# Patient Record
Sex: MALE | Race: WHITE
[De-identification: names, ages, dates, MRNs, and addresses within clinical notes are randomized per-mention and may not be internally consistent; named-entity substitution may affect disease eponyms.]

---

## 2020-08-10 ENCOUNTER — HOSPITAL ENCOUNTER (OUTPATIENT)
Dept: HOSPITAL 84 - D.CATH | Age: 65
Discharge: HOME | End: 2020-08-10
Attending: INTERNAL MEDICINE
Payer: MEDICARE

## 2020-08-10 VITALS — WEIGHT: 224.87 LBS | BODY MASS INDEX: 30.46 KG/M2 | HEIGHT: 72 IN | BODY MASS INDEX: 30.46 KG/M2

## 2020-08-10 VITALS — SYSTOLIC BLOOD PRESSURE: 140 MMHG | DIASTOLIC BLOOD PRESSURE: 59 MMHG

## 2020-08-10 DIAGNOSIS — I20.0: Primary | ICD-10-CM

## 2020-08-10 DIAGNOSIS — R07.89: ICD-10-CM

## 2020-08-10 LAB
ALT SERPL-CCNC: 49 U/L (ref 10–68)
ANION GAP SERPL CALC-SCNC: 12.1 MMOL/L (ref 8–16)
BASOPHILS NFR BLD AUTO: 0.2 % (ref 0–2)
BUN SERPL-MCNC: 11 MG/DL (ref 7–18)
CALCIUM SERPL-MCNC: 9.3 MG/DL (ref 8.5–10.1)
CHLORIDE SERPL-SCNC: 109 MMOL/L (ref 98–107)
CHOLEST/HDLC SERPL: 1.7 RATIO (ref 2.3–4.9)
CO2 SERPL-SCNC: 26.9 MMOL/L (ref 21–32)
CREAT SERPL-MCNC: 0.9 MG/DL (ref 0.6–1.3)
EOSINOPHIL NFR BLD: 1.4 % (ref 0–7)
ERYTHROCYTE [DISTWIDTH] IN BLOOD BY AUTOMATED COUNT: 13.6 % (ref 11.5–14.5)
GLUCOSE SERPL-MCNC: 96 MG/DL (ref 74–106)
HCT VFR BLD CALC: 40.8 % (ref 42–54)
HDLC SERPL-MCNC: 59 MG/DL (ref 32–96)
HGB BLD-MCNC: 13.1 G/DL (ref 13.5–17.5)
IMM GRANULOCYTES NFR BLD: 0.2 % (ref 0–5)
LDL-HDL RATIO: 0.5 RATIO (ref 1.5–3.5)
LDLC SERPL-MCNC: 29 MG/DL (ref 0–100)
LYMPHOCYTES NFR BLD AUTO: 17.8 % (ref 15–50)
MCH RBC QN AUTO: 30.4 PG (ref 26–34)
MCHC RBC AUTO-ENTMCNC: 32.1 G/DL (ref 31–37)
MCV RBC: 94.7 FL (ref 80–100)
MONOCYTES NFR BLD: 7.5 % (ref 2–11)
NEUTROPHILS NFR BLD AUTO: 72.9 % (ref 40–80)
OSMOLALITY SERPL CALC.SUM OF ELEC: 285 MOSM/KG (ref 275–300)
PLATELET # BLD: 186 10X3/UL (ref 130–400)
PMV BLD AUTO: 11.8 FL (ref 7.4–10.4)
POTASSIUM SERPL-SCNC: 4 MMOL/L (ref 3.5–5.1)
RBC # BLD AUTO: 4.31 10X6/UL (ref 4.2–6.1)
SODIUM SERPL-SCNC: 144 MMOL/L (ref 136–145)
TRIGL SERPL-MCNC: 74 MG/DL (ref 30–200)
WBC # BLD AUTO: 6.6 10X3/UL (ref 4.8–10.8)

## 2020-08-10 NOTE — HEMODYNAMI
PATIENT:SU HAM                                    MEDICAL RECORD: [id number]
: 51                                            LOCATION:[facility]          
ACCT# [id number]                                       ADMISSION DATE: 08/10/20
 
 
 Generatedon:8/10/017187:18
Patient name: SU HAM Patient #: [id number] Visit #: [id number] SSN: 
: 1955 Date
of study: 8/10/2020
Page: Of
Hemodynamic Procedure Report
****************************
Patient Data
Patient Demographics
Procedure consent was obtained
First Name: SU            Gender: Male
Last Name: FATOUMATA         : 1955
Patient #: [id number]       Age: 65 year(s)
Visit #: [id number]       Race: 
Accession #:
[id number]
Additional ID: [id number]
Contact details
Address: [address] [place] [address]      Phone: [phone]
DRIVE
State: [place]
City: [place]
Zip code: [address]
Past Medical History
Performed procedures and imaging results
Date     Procedure        Procedure Results      Comments
Stress testing   Positive->Intermediate
with SPECT MPI   risk
Allergies: No known allergies
Admission
Admission Data
Admission Date: 8/10/2020   Admission Time: 9:04
Procedure
Procedure Types
Cath Procedure
Diagnostic Procedure
LHC
LHC w/Coronaries
Sedation Charges
Moderate Sedation up to 15 minutes
Procedure Description
Procedure Date
Procedure Date: 8/10/2020
Procedure Start Time: 10:57
Procedure End Time: 11:12
Procedure Staff
Name                            Function
Juan Carlos Augustine RT                  Scrub
Elvira Juarez RT                    Monitor
Mayur Gould RN                  Nurse
Denzel Alfonso MD                   Performing Physician
Indication
Abnormal cardiolyte
Procedure Data
 
Cath Procedure
Fluoroscopy
Diagnostic fluoroscopy      Total fluoroscopy Time: 4.5
time: 4.5 min               min
Diagnostic fluoroscopy      Total fluoroscopy dose: 715
dose: 715 mGy               mGy
Contrast Material
Contrast Material Type                       Amount (ml)
Isovue 300                                   76
Entry Location
Entry     Primary  Successful  Side  Size  Upsize Upsize Entry    Closure     Peterson
ccessful  Closure
Location                             (Fr)  1 (Fr) 2 (Fr) Remarks  Device        
          Remarks
Radial                         Right 6 Fr                         Mechanical
artery                               Short                        Compression
Estimated blood loss: 5 ml
Diagnostic catheters
Device Type               Used For           End Catheter
Placement
DIAGNOSTIC [place] 110cm 5  Multi-vessel
Fr catheter (516809)      Angiography
DIAGNOSTIC [place] 110cm    Left Coronary
5Fr catheter (223940)     Angiography
Procedure Complications
No complications
Procedure Medications
Medication           Administration Route Dosage
Oxygen               etCO2 Nasal cannula  2 l/min
Lidocaine 2%         added to field       20
Heparin Flush Bag    added to field       2 bags
(1000units/500ml NS)
0.9% NaCl            I.V.                 100 ml/hr
Versed               I.V.                 2 mg
Fentanyl             I.V.                 100 mcg
Versed               I.V.                 2 mg
Fentanyl             I.V.                 50 mcg
Versed               I.V.                 2 mg
Fentanyl             I.V.                 50 mcg
Versed               I.V.                 2 mg
Radial Cocktail      I.A.                 1 syringe
(Verapamil 2mg/Nitro
400mcg/Heparin
1500units)
Hemodynamics
Rest
Heart Rate: 68 (bpm)
Pressure Samples
Time     Site     Value (mmHg) Purpose      Heart      Use
Rate(bpm)
10:59    LV       118/-3,12    Snapshot     84
11:00    LV       139/-6,12    Snapshot     63
Gradients
Valve  Time  Site Site Mean    SEP/DFP    Peak To Heart  Use
1    2    (mmHg)  (sec/min)  Peak    Rate
(mmHg)  (bpm)
Aortic 11:00 LV   AO                              93
Snapshots
Pre Cath      Intra         NCS           Post Cath
Vital Signs
 
Time     Heart  Resp   SPO2 etCO2   NIBP (mmHg) Rhythm  Pain    Sedation
Rate   (ipm)  (%)  (mmHg)                      Status  Level
(bpm)
10:38:47 60     16     97   0       153/67(102) NSR     0 (11)  10(A)
, No
pain
10:44:09 47     15     91   18.8    138/72(119) NSR     0 (11)  10(A)
, No
pain
10:48:29 53     13     91   17.2    138/74(116) NSR     0 (11)  10(A)
, No
pain
10:53:44 46     11     94   12.7    130/66(101) NSR     0 (11)  10(A)
, No
pain
10:58:25 62     19     96   35.3    144/84(121) NSR     0 (11)  10(A)
, No
pain
11:02:43 88     13     92   33.8    121/63(94)  NSR     0 (11)  9(A)
, No
pain
11:07:46 59     12     94   13.5    130/66(106) NSR     0 (11)  9(A)
, No
pain
11:12:06 55     11     94   33.8    130/71(105) NSR     0 (11)  10(A)
, No
pain
Medications
Time     Medication       Route   Dose    Verified Delivered Reason       Notes 
 Effectiveness
by       by
10:41:03 Oxygen           etCO2   2 l/min Denzel     Buffie    used for
Nasal           Kaden Gould RN   procedure
cannula
10:41:10 Lidocaine 2%     added   20ml    Denzel     Denzel      for local
to      vial    Kaden Alfonso MD  anesthetic
field
10:41:16 Heparin Flush    added   2 bags  Denzel     Denzel      used for
Bag              to              Kaden Alfonso MD  procedure
(1000units/500ml field
NS)
10:41:25 0.9% NaCl        I.V.    100     Denzel     Buffie    Per
ml/hr   Kaden Gould RN   physician
10:47:54 Versed           I.V.    2 mg    Denzel     Buffie    for sedation
Kaden Gould RN
10:48:00 Fentanyl         I.V.    100 mcg Denzel     Buffie    for sedation
Kaden Gould RN
10:53:00 Fentanyl         I.V.    50 mcg  Denzel     Buffie    for sedation
Kaden Gould RN
10:53:55 Versed           I.V.    2 mg    Denzel     Buffie    for sedation
Kaden Gould RN
10:59:05 Radial Cocktail  I.A.    1       Denzel     Denzel      for
(Verapamil               syringe Kaden Alfonso MD  vasodilation
2mg/Nitro
400mcg/Hepari
10:59:49 Versed           I.V.    2 mg    Denzel     Buffie    for sedation
Kaden Gould RN
10:59:52 Fentanyl         I.V.    50 mcg  Denzel     Buffie    for sedation
Kaden Gould RN
11:03:39 Versed           I.V.    2 mg    Denzel     Buffie    for sedation
 
Kaden Gould RN
Procedure Log
Time     Note
10:20:30 Mayur Gould RN sent for patient. Start room use.
10:24:33 Informed consent obtained and on chart
10:24:39 Diagnostic Cath Status : Elective
10:27:31 Indication : Abnormal cardiolyte
10:33:53 Time tracking: Regular hours (M-F 7:00 - 5:00)
10:33:59 Plan of Care:Hemodynamics will remain stable., Cardiac
rhythm will remain stable., Comfort level will be
maintained., Respiratory function will remain
adequate., Patient/ family verbilizes understanding of
procedure., Procedure tolerated without complication.,
Recovers from procedure without complications..
10:34:10 Patient received from Pre/Post Procedure Room to [facility] 1
Alert and oriented. Tansferred to table in Supine
position.
10:34:12 Warm blankets applied, and bita hugger turned on for
patient comfort.
10:34:13 Correct patient and procedure confirmed by team.
10:34:14 ECG and BP/O2 sat monitors applied to patient.
10:37:01 Use device set Radial Dx or PCI
10:37:03 ACIST Syringe (04329) opened to sterile field.
10:37:04 Medline Cath Pack ([id number]) opened to sterile field.
10:37:05 ACIST Hand Control (52673) opened to sterile field.
10:37:05 Bag Decanter (2002S) opened to sterile field.
10:37:06 ACIST Manifold (11147) opened to sterile field.
10:37:07 Tegaderm 4 x 4 (1626W) opened to sterile field.
10:37:08 MBrace Wrist Support ([id number]) opened to sterile
field.
10:37:11 NEEDLE Cook 21G 4cm Radial (N32659) opened to sterile
field.
10:37:20 EMERALD Guide Wire (507-811) opened to sterile field.
10:37:21 SHEATH 6FR RAIN (0164372) opened to sterile field.
10:37:31 Vital chart was started
10:37:31 Baseline sample Acquired.
10:38:23 Baseline sample Acquired.
10:38:32 Rhythm: sinus bradycardia
10:38:39 Full Disclosure recording started
10:38:57 H&P Date Dictated: 8/10/2020 Within 30 days and on
chart..
10:38:59 Pre-procedure instructions explained to patient.
10:39:01 Pre-op teaching completed and patient verbalized
understanding.
10:39:07 Family in waiting room.
10:39:11 Patient NPO since Midnight.
10:39:20 Patient allergic to No known allergies
10:39:23 Is the patient allergic to Iodine/contrast media? No.
10:39:34 Is patient on blood thinner?No
10:39:42 Patient diabetic? No.
10:39:47 Snore? Yes
10:39:48 Sleep apnea? No
10:39:50 Deviated septum? No
10:39:51 Opens mouth fully? Yes
10:39:52 Sticks out tongue? Yes
10:39:56 Airway obstruction? No ?
10:40:00 Dentures? No ?
10:40:06 Pre procedure: right dorsailis pedis pulse 2+ Normal;
easily identifiable; not easily obliterated
10:40:12 Modified Joel's test Ulnar < 7 seconds
 
10:40:25 IV patent on arrival in left hand with 0.9% NaCl at
[facility].
10:41:03 Oxygen 2 l/min etCO2 Nasal cannula was administered by
Mayur Gould RN; used for procedure; Verbal order read
back and verified.
10:41:10 Lidocaine 2% 20ml vial added to field was administered
by Denzel Alfonso MD; for local anesthetic; Verbal order
read back and verified.
10:41:16 Heparin Flush Bag (1000units/500ml NS) 2 bags added to
field was administered by Denzel Alfonso MD; used for
procedure; Verbal order read back and verified.
10:41:25 0.9% NaCl 100 ml/hr I.V. was administered by Mayur Gould RN; Per physician; Verbal order read back and
verified.
10:46:18 Lab results completed and on chart.
10:46:38 Right Radial & Right Groin area was prepped with
chlora-prep and draped in sterile fashion
10:46:39 Sharps counted by scrub and verified by R.N.
10:46:39 Alarms reviewed by R. N.
10:46:40 Physician arrived
10:46:41 Final Timeout: patient, procedure, and site verified
with staff and physician. All members of the team are
in agreement.
10:46:41 --------ALL STOP TIME OUT------
10:46:43 Right Radial & Right Groin site verified by team.
10:46:47 Fire Safety Assessment: A--An alcohol-based skin
anteseptic being used preoperatively., C--Open oxygen
or nitrous oxide is being used., D--An ESU, laser, or
fiber-optic light is being used.
10:46:50 Physical assessment completed. ASA score P 2 - A
patient with mild systemic disease as per Denzel Alfonso MD.
10:47:54 Versed 2 mg I.V. was administered by Mayur Gould RN;
for sedation; Verbal order read back and verified.
10:48:00 Fentanyl 100 mcg I.V. was administered by Mayur Gould RN; for sedation; Verbal order read back and verified.
10:49:45 Use device set Radial Dx or PCI
10:49:46 EMERALD Guide Wire (615-998) opened to sterile field.
10:49:47 SHEATH 6FR RAIN (5010847) opened to sterile field.
10:49:51 ACIST Syringe (83696) opened to sterile field.
10:49:52 Medline Cath Pack ([id number]) opened to sterile field.
10:49:53 Bag Decanter (2002S) opened to sterile field.
10:49:54 ACIST Hand Control (58889) opened to sterile field.
10:49:55 ACIST Manifold (97633) opened to sterile field.
10:49:56 Tegaderm 4 x 4 (1626W) opened to sterile field.
10:49:58 MBrace Wrist Support ([id number]) opened to sterile
field.
10:49:59 NEEDLE Cook 21G 4cm Radial (Y55380) opened to sterile
field.
10:53:00 Fentanyl 50 mcg I.V. was administered by Mayur Gould
RN; for sedation; Verbal order read back and verified.
10:53:55 Versed 2 mg I.V. was administered by Mayur Gould RN;
for sedation; Verbal order read back and verified.
10:56:36 Zero performed for pressure channel P1
10:56:50 Zero performed for pressure channel P1
10:57:47 Procedure started.
10:57:50 Local anesthetic to right radial artery with Lidocaine
2% by Denzel Alfonso MD.**INITIAL ACCESS ONLY**
10:57:58 A 6 Fr Short sheath was inserted into the Right Radial
artery
 
10:58:05 A DIAGNOSTIC [place] 110cm 5 Fr catheter (225639) was
advanced over the wire and used for Multi-vessel
Angiography.
10:59:05 Radial Cocktail (Verapamil 2mg/Nitro 400mcg/Heparin
1500units) 1 syringe I.A. was administered by Denzel Alfonso MD; for vasodilation; Verbal order read back and
verified.
10:59:49 Versed 2 mg I.V. was administered by Mayur Gould RN;
for sedation; Verbal order read back and verified.
10:59:52 Fentanyl 50 mcg I.V. was administered by Mayur Gould
RN; for sedation; Verbal order read back and verified.
11:00:10 LV hemodynamics recorded.
11:00:11 LV gram done using NOBLE
11:00:21 Injector settings: Ml/sec: 5, Volume: 15,
11:00:27 EF : 60 %
11:01:47 RCA angiography performed.
11:01:54 Injector settings: Ml/sec: 3, Volume: 6,
11:01:55 Catheter removed.
11:02:04 A DIAGNOSTIC [place] 110cm 5Fr catheter (940934) was
advanced over the wire and used for Left Coronary
Angiography.
11:03:39 Versed 2 mg I.V. was administered by Mayur Gould RN;
for sedation; Verbal order read back and verified.
11:05:09 Catheter removed. unable to cannulate vessel.
11:05:46 GUIDE 5FR EBU 3.5 catheter ([id number]) opened to
sterile field.
11:05:58 5 Fr ebu 3.5 guide catheter was inserted over the wire
11:07:18 LCA angiography performed.
11:07:21 Injector settings: Ml/sec: 3, Volume: 6,
11:08:03 Catheter removed.
11:09:27 Sheath removed intact; hemostasis achieved with
Mechanical Compression to the Right Radial artery.
11:09:29 Procedure ended.(Physican Out)
11:09:41 Fluoroscopy time 04.50 minutes.
11:09:47 Fluoroscopy dose: 715 mGy
11:09:47 Flurop Dose total: 715
11:09:52 Dose Area Product 76493 mGy/cm.
11:09:56 Contrast amount:Isovue 300 76ml.
11:09:58 Sharps counted by scrub and verified by R.N.
11:09:58 Maximum allowable dose exceeded? No.
11:10:01 [place] band inflated with 12cc of air.
11:10:03 Insertion/operative site no bleeding no hematoma.
11:10:09 Post right radial artery:stable
11:10:11 Post Procedure Pulses reassessed and unchanged
11:10:14 Post procedure rhythm: unchanged.
11:10:16 Estimated blood loss: 5 ml
11:10:18 Post procedure instruction explained to
patient.Patient verbalizes understanding.
11:10:19 Patient needs reinforcement of post procedure
teaching.
11:10:51 Procedure type changed to Cath procedure, Diagnostic
procedure, LHC, LHC w/Coronaries, Sedation Charges,
Moderate Sedation up to 15 minutes
11:11:01 ZEPHYR REGULAR TR BAND (905098) opened to sterile
field.
11:12:18 Procedure and supply charges have been captured,
reviewed, submitted and are correct.
11:12:22 Procedure Complication : No complications
11:12:24 Vital chart was stopped
11:12:25 [facility] Findings: mild to moderate CAD (<70%)
 
11:12:27 Operative report dictated upon procedure completion.
11:12:28 See physician's report for complete and final results.
11:12:29 Report given to Pre/Post Procedure Room.
11:12:32 Patient transfered to Pre/Post Procedure Room with
Bed.
11:12:35 Full Disclosure recording stopped
11:12:35 Procedure ended.
11:12:43 End room use (Document Last)
11:13:05 End room use (Document Last)
Device Usage
Item Name   Manufacture  Quantity  Catalog      Hospital Part     Current Minima
l Lot# /
Number       Charge   Number   Stock   Stock   Serial#
Code
ACIST       Acist        2         25248        513572   049689   192971  20
Syringe     Medical
(56846)     Systems Inc
Medline     Medline      2         [id number]    082114   59707    016314  5
Cath Pack
([id number])
Bag         Microtek     2                 392167   97723    780353  5
Decanter    Medical Inc.
()
ACIST Hand  Acist        2         50898        495575   669300   071912  5
Control     Medical
(31600)     Systems Inc
ACIST       Acist        2         94415        260417   586911   950004  5
Manifold    Medical
(79640)     Systems Inc
Tegaderm 4  3M           2         1626W        914595   020478   259480  5
x 4 (1626W)
MBrace      Advanced     2         [id number]  575228   76301    298454  5
Wrist       Vascular
Support     Dynamics
(522210362)
NEEDLE Cook Cook Medical 2         A91721       319756   530834   810542  5
21G 4cm
Radial
(C99534)
EMERALD     Cardinal     2         502-455      841375   691011   072541  5
Guide Wire  Health
(370-455)
SHEATH 6FR  Cardinal     2         5069948      174377   1719575  569140  5
[facility]
(0846410)
DIAGNOSTIC  Terumo       1               256909   196638   755177  5
[place] 110cm
5 Fr
catheter
(153760)
DIAGNOSTIC  Terumo       1               079240   617206   290845  5
[place] 110cm
5Fr
catheter
(727795)
GUIDE 5FR   Medtronic    1         [id number]     224367   019339   003125  1
EBU 3.5
catheter
([id number])
ZEPHYR      Cardinal     1         039998       104312   6172444  693247  5
 
REGULAR TR  Health
BAND
(586617)
Signature [place]
Stage           Time        Signature      Unsigned
Intra-Procedure 8/10/2020   Elvira Juarez
11:13:05 AM RT(R)
Intra-Procedure 8/10/2020   Mayur Gould RN
11:13:39 AM
Intra-Procedure 8/10/2020   Denzel Alfonso MD
11:14:25 AM                8/10/2020 11:17:53
AM
Intra-Procedure 8/10/2020   Denzel Alfonso MD
11:18:19 AM
 
 
 
 
 
 
 
 
 
 
 
 
 
 
 
 
 
 
 
 
 
 
 
 
 
 
 
 
 
 
 
 
 
 
 
 
 
 
 
 
 
[facility]                                          
[address] [place] [address]

## 2020-08-10 NOTE — NUR
TOTAL 7CC AIR REMOVED FROM Z BAND, NO S/S BLEEDING OR HEMATOMA.
PULSES PALP.  VSS, HAND WARM. WIFE AT BS.

## 2020-08-10 NOTE — NUR
ALL AIR REMOVED FROM Z BAND, NO S/S BLEEDING OR HEMATOMA. PULSES
PALP. WILL CONT CLOSE MONITORING. C/L IN REACH.

## 2020-08-10 NOTE — NUR
PT REC'D TO ROOM 4 VIA STRETCHER FROM CATH LAB.  MONITORS ESTAB. WIFE
AT BS.  SEE RN ASSESSMENT, ALARMS ON AND C/L IN REACH.

## 2020-08-10 NOTE — NUR
R Z BAND OFF, NO S/S BLEEDING OR HEMATOMA, DSG APPLIED.  PIV D/C'D
INTACT, DSG APPLIED.  PT ALLOWED UP TO GET DRESSED AND GO TO BR
INDEPENDENTLY.

## 2020-08-10 NOTE — NUR
R WRIST SITE C/D/I, NO S/S BLEEDING OR HEMATOMA. PULSES PALP. PT
WATCHING TV, GIVEN COFFEE PER REQUEST. VSS. C/L IN REACH.

## 2020-08-10 NOTE — NUR
R WRIST Z BAND SITE C/D/I, NO S/S BLEEDING OR SWELLING. R ARM/HAND
WARM WITH PALP PULSES AND BRISK CAP REFILL. VSS. PT RESTING QUIETLY,
ALARMS ON AND C/L IN REACH.

## 2020-08-10 NOTE — NUR
ALL DISCHARGE INSTRUCTIONS REVIEWED WITH PT AND HIS WIFE, INCLUDING
RESTRICTIONS, MEDS AND F/U APPT. NO QUESTIONS AT THIS TIME.

## 2024-03-19 NOTE — NUR
PT D/C'D TO PRIVATE VEHICLE WITH WIFE, PT HAS ALL BELONGINGS AND
PAPERWORK. Subjective:       Patient ID: Ousmane Gaspar is a 50 y.o. male.    Chief Complaint: Diabetes Mellitus  Patient presents with escort from [facility] for annual diabetic foot exam, establish care due to history of diabetes and stroke affecting the left side.  Patient is nonverbal  Appears diabetes is well-controlled with 500 mg metformin daily  Recent labs or A1c    Past Medical History:   Diagnosis Date    Autoimmune thyroiditis     CVA (cerebrovascular accident)     Depression     Diabetes mellitus     Diabetes mellitus, type 2     Hyperlipidemia     Hypertension     Hypothyroidism     Mood disorder     Stroke     Thyroid disease      History reviewed. No pertinent surgical history.  History reviewed. No pertinent family history.  Social History     Socioeconomic History    Marital status: Single   Tobacco Use    Smoking status: Never    Smokeless tobacco: Never   Substance and Sexual Activity    Alcohol use: Never    Drug use: Never       Current Outpatient Medications   Medication Sig Dispense Refill    baclofen (LIORESAL) 20 MG tablet Take 20 mg by mouth 3 (three) times daily.      cetirizine (ZYRTEC) 10 MG tablet Take 10 mg by mouth once daily.      clopidogreL (PLAVIX) 75 mg tablet Take 75 mg by mouth once daily.      docusate sodium (COLACE) 100 MG capsule Take 100 mg by mouth 2 (two) times daily.      fluticasone propionate (FLONASE) 50 mcg/actuation nasal spray by Each Nostril route.      levothyroxine (SYNTHROID) 50 MCG tablet Take 50 mcg by mouth once daily.      megestroL (MEGACE) 400 mg/10 mL (40 mg/mL) Susp Take by mouth.      metFORMIN (GLUCOPHAGE-XR) 500 MG ER 24hr tablet Take 500 mg by mouth once daily.      midodrine (PROAMATINE) 2.5 MG Tab Take 2.5 mg by mouth 2 (two) times daily.      oxybutynin (DITROPAN-XL) 10 MG 24 hr tablet Take 10 mg by mouth.      pantoprazole (PROTONIX) 40 MG tablet Take 40 mg by mouth.      pravastatin (PRAVACHOL) 40 MG tablet Take 40 mg by mouth once daily.    "   risperiDONE (RISPERDAL) 0.25 MG Tab Take by mouth.      albuterol (PROVENTIL/VENTOLIN HFA) 90 mcg/actuation inhaler SMARTSI Puff(s) Via Inhaler 4 Times Daily PRN      albuterol-ipratropium (DUO-NEB) 2.5 mg-0.5 mg/3 mL nebulizer solution Take by nebulization.      doxycycline (VIBRAMYCIN) 100 MG Cap Take 100 mg by mouth.      ibuprofen (ADVIL,MOTRIN) 600 MG tablet Take 600 mg by mouth every 6 (six) hours as needed.      tobramycin sulfate 0.3% (TOBREX) 0.3 % ophthalmic solution Place into both eyes.       No current facility-administered medications for this visit.     Review of patient's allergies indicates:   Allergen Reactions    Ampicillin     Penicillin      Other Reaction(s): Other (See Comments)       Review of Systems   Cardiovascular:  Positive for leg swelling.        Mild   Musculoskeletal:  Positive for gait problem.   All other systems reviewed and are negative.      Objective:      Vitals:    24 0935   BP: 108/76   Pulse: 95   Resp: 16   Height: 5' 4" (1.626 m)     Physical Exam  Vitals and nursing note reviewed. Exam conducted with a chaperone present.   Cardiovascular:      Pulses:           Dorsalis pedis pulses are 2+ on the right side and 2+ on the left side.        Posterior tibial pulses are 1+ on the right side and 1+ on the left side.   Pulmonary:      Effort: Pulmonary effort is normal.   Musculoskeletal:         General: No tenderness.      Right foot: Decreased range of motion. Deformity (Class/mallet toes 2nd and 3rd digits bilateral, drop foot left) present.      Left foot: Decreased range of motion. Deformity present.      Comments: Limited mobility, wheelchair, could not transfer   Feet:      Right foot:      Skin integrity: No skin breakdown or erythema.      Toenail Condition: Right toenails are abnormally thick and long. Fungal disease present.     Left foot:      Skin integrity: No skin breakdown or erythema.      Toenail Condition: Left toenails are long.   Skin:     " Capillary Refill: Capillary refill takes 2 to 3 seconds.   Neurological:      Mental Status: He is alert.                    Assessment:       1. Comprehensive diabetic foot examination, type 2 DM, encounter for    2. Controlled type 2 diabetes mellitus with other neurologic complication, without long-term current use of insulin    3. Foot drop, left    4. Mallet toe, unspecified laterality    5. Onychomycosis of toenail - Right Foot        Plan:             Comprehensive diabetic pedal exam performed  No history of neuropathy  Diabetes appears well-controlled with no acute complications due to diabetes regarding feet at this time  Patient presents in appropriate shoes  Claw/mallet toe 2nd and 3rd digit bilateral most likely congenital/inherited with no areas of erythema  Right hallux fungal nail, thickness reduced  Other nails debrided with no complications  Reviewed importance of regular foot checks and potential complications due to diabetes  Contact office with any changes  Caregiver in understanding and agreement with treatment plan.  I counseled the patient on their conditions, implications and medical management.  Instructed to contact the office with any changes, questions, concerns, worsening of symptoms.   Total face to face time 20 minutes, exam, assessment, treatment, discussion, additional time for review of chart prior to and following appointment and visit documentation, consultation and coordination of care.   Follow up as needed    This note was created using [employer] voice recognition software that occasionally misinterpreted phrases or words.